# Patient Record
Sex: FEMALE | Race: WHITE | URBAN - NONMETROPOLITAN AREA
[De-identification: names, ages, dates, MRNs, and addresses within clinical notes are randomized per-mention and may not be internally consistent; named-entity substitution may affect disease eponyms.]

---

## 2022-01-25 ENCOUNTER — APPOINTMENT (RX ONLY)
Dept: URBAN - NONMETROPOLITAN AREA CLINIC 1 | Facility: CLINIC | Age: 32
Setting detail: DERMATOLOGY
End: 2022-01-25

## 2022-01-25 DIAGNOSIS — L71.0 PERIORAL DERMATITIS: ICD-10-CM | Status: INADEQUATELY CONTROLLED

## 2022-01-25 PROCEDURE — ? COUNSELING

## 2022-01-25 PROCEDURE — ? PHOTO-DOCUMENTATION

## 2022-01-25 PROCEDURE — ? PRESCRIPTION

## 2022-01-25 PROCEDURE — ? TREATMENT REGIMEN

## 2022-01-25 PROCEDURE — ? ADDITIONAL NOTES

## 2022-01-25 PROCEDURE — ? MEDICATION COUNSELING

## 2022-01-25 PROCEDURE — ? FULL BODY SKIN EXAM - DECLINED

## 2022-01-25 PROCEDURE — 99203 OFFICE O/P NEW LOW 30 MIN: CPT

## 2022-01-25 RX ORDER — PIMECROLIMUS 10 MG/G
CREAM TOPICAL
Qty: 60 | Refills: 4 | Status: ERX | COMMUNITY
Start: 2022-01-25

## 2022-01-25 RX ORDER — DOXYCYCLINE 40 MG/1
CAPSULE ORAL
Qty: 30 | Refills: 2 | Status: ERX | COMMUNITY
Start: 2022-01-25

## 2022-01-25 RX ADMIN — PIMECROLIMUS: 10 CREAM TOPICAL at 00:00

## 2022-01-25 RX ADMIN — DOXYCYCLINE: 40 CAPSULE ORAL at 00:00

## 2022-01-25 ASSESSMENT — LOCATION ZONE DERM: LOCATION ZONE: FACE

## 2022-01-25 ASSESSMENT — LOCATION SIMPLE DESCRIPTION DERM: LOCATION SIMPLE: LEFT CHEEK

## 2022-01-25 ASSESSMENT — LOCATION DETAILED DESCRIPTION DERM: LOCATION DETAILED: LEFT INFERIOR MEDIAL MALAR CHEEK

## 2022-01-25 NOTE — PROCEDURE: MEDICATION COUNSELING
S. Palleschi, MD Metronidazole Pregnancy And Lactation Text: This medication is Pregnancy Category B and considered safe during pregnancy.  It is also excreted in breast milk.

## 2022-01-25 NOTE — PROCEDURE: MEDICATION COUNSELING
Xelmarcusz Pregnancy And Lactation Text: This medication is Pregnancy Category D and is not considered safe during pregnancy.  The risk during breast feeding is also uncertain.

## 2022-01-25 NOTE — PROCEDURE: TREATMENT REGIMEN
Initiate Treatment: Elidel 1% cream (Apply to aa on face BID)\\nOracea 40 mg take 1 tab po qd
Plan: if unable to get oracea, doxy 20 bid
Detail Level: Zone
Continue Regimen: Avoid fluoride containing tooth paste

## 2022-01-25 NOTE — HPI: RASH
Is This A New Presentation, Or A Follow-Up?: Rash
Additional History: Patient says that she has had a flaky red rash on her face for the last 2 months which comes and goes. She hasn’t tried any form of treatment, but did stop using fluoride toothpaste 1 tamra ago, because she thought it may be contributing to the rash and it did help her achieve some clearance.

## 2022-03-08 ENCOUNTER — APPOINTMENT (RX ONLY)
Dept: URBAN - NONMETROPOLITAN AREA CLINIC 1 | Facility: CLINIC | Age: 32
Setting detail: DERMATOLOGY
End: 2022-03-08

## 2022-03-08 DIAGNOSIS — L71.0 PERIORAL DERMATITIS: ICD-10-CM | Status: IMPROVED

## 2022-03-08 PROCEDURE — ? MEDICATION COUNSELING

## 2022-03-08 PROCEDURE — ? COUNSELING

## 2022-03-08 PROCEDURE — ? PHOTO-DOCUMENTATION

## 2022-03-08 PROCEDURE — 99213 OFFICE O/P EST LOW 20 MIN: CPT

## 2022-03-08 PROCEDURE — ? ADDITIONAL NOTES

## 2022-03-08 PROCEDURE — ? TREATMENT REGIMEN

## 2022-03-08 PROCEDURE — ? FULL BODY SKIN EXAM - DECLINED

## 2022-03-08 ASSESSMENT — LOCATION DETAILED DESCRIPTION DERM
LOCATION DETAILED: LEFT SUPERIOR MEDIAL BUCCAL CHEEK
LOCATION DETAILED: RIGHT CHIN
LOCATION DETAILED: RIGHT SUPERIOR MEDIAL BUCCAL CHEEK
LOCATION DETAILED: PHILTRUM

## 2022-03-08 ASSESSMENT — LOCATION ZONE DERM
LOCATION ZONE: FACE
LOCATION ZONE: LIP

## 2022-03-08 ASSESSMENT — LOCATION SIMPLE DESCRIPTION DERM
LOCATION SIMPLE: UPPER LIP
LOCATION SIMPLE: LEFT CHEEK
LOCATION SIMPLE: RIGHT CHEEK
LOCATION SIMPLE: CHIN

## 2022-03-08 NOTE — PROCEDURE: TREATMENT REGIMEN
Continue Regimen: Oracea 40 mg (take 1 tab po daily)\\nElidel 1% cream (apply to aa on face bid)
Detail Level: Zone
Plan: Will continue for another 6 weeks and then consider d/c oracea.

## 2022-03-08 NOTE — PROCEDURE: MEDICATION COUNSELING
Informed mother OT form was not received thru portal in August and at all, sent link to proxy access, advised can drop it off or fax or send thru portal and can be faxed to number provided on form once complete and 72 clinic hour turn around time, mother expressed understanding   Ivermectin Counseling:  Patient instructed to take medication on an empty stomach with a full glass of water.  Patient informed of potential adverse effects including but not limited to nausea, diarrhea, dizziness, itching, and swelling of the extremities or lymph nodes.  The patient verbalized understanding of the proper use and possible adverse effects of ivermectin.  All of the patient's questions and concerns were addressed.

## 2022-03-08 NOTE — PROCEDURE: MEDICATION COUNSELING
Cooperative Nsaids Counseling: NSAID Counseling: I discussed with the patient that NSAIDs should be taken with food. Prolonged use of NSAIDs can result in the development of stomach ulcers.  Patient advised to stop taking NSAIDs if abdominal pain occurs.  The patient verbalized understanding of the proper use and possible adverse effects of NSAIDs.  All of the patient's questions and concerns were addressed.
